# Patient Record
Sex: MALE | Race: WHITE | Employment: FULL TIME | URBAN - METROPOLITAN AREA
[De-identification: names, ages, dates, MRNs, and addresses within clinical notes are randomized per-mention and may not be internally consistent; named-entity substitution may affect disease eponyms.]

---

## 2021-12-10 ENCOUNTER — HOSPITAL ENCOUNTER (OUTPATIENT)
Dept: PHYSICAL THERAPY | Age: 56
Discharge: HOME | End: 2021-12-10
Payer: OTHER GOVERNMENT

## 2021-12-10 PROCEDURE — 97110 THERAPEUTIC EXERCISES: CPT

## 2021-12-10 PROCEDURE — 97140 MANUAL THERAPY 1/> REGIONS: CPT

## 2021-12-10 PROCEDURE — 97162 PT EVAL MOD COMPLEX 30 MIN: CPT

## 2021-12-10 NOTE — PROGRESS NOTES
PT DAILY TREATMENT NOTE     Patient Name: You Burgos  Date:12/10/2021  : 1965  [x]  Patient  Verified  Payor:  / Plan: Guy Mccurdy 73 / Product Type:  /    In time:1000  Out time:1052  Total Treatment Time (min): 52  Visit #: 1 of     Medicare/BCBS Only   Total Timed Codes (min):   1:1 Treatment Time:         Treatment Area: Left elbow pain [M25.522]    SUBJECTIVE  Pain Level (0-10 scale): 4-5/10  Any medication changes, allergies to medications, adverse drug reactions, diagnosis change, or new procedure performed?: [x] No    [] Yes (see summary sheet for update)  Subjective functional status/changes:   [] No changes reported      OBJECTIVE    Modality rationale:    Min Type Additional Details    [] Estim:  []Unatt       []IFC  []Premod                        []Other:  []w/ice   []w/heat  Position:  Location:    [] Estim: []Att    []TENS instruct  []NMES                    []Other:  []w/US   []w/ice   []w/heat  Position:  Location:    []  Traction: [] Cervical       []Lumbar                       [] Prone          []Supine                       []Intermittent   []Continuous Lbs:  [] before manual  [] after manual    []  Ultrasound: []Continuous   [] Pulsed                           []1MHz   []3MHz W/cm2:  Location:    []  Iontophoresis with dexamethasone         Location: [] Take home patch   [] In clinic    []  Ice     []  heat  []  Ice massage  []  Laser   []  Anodyne Position:  Location:    []  Laser with stim  []  Other:  Position:  Location:    []  Vasopneumatic Device    []  Right     []  Left  Pre-treatment girth:  Post-treatment girth:  Measured at (location):  Pressure:       [] lo [] med [] hi   Temperature: [] lo [] med [] hi   [] Skin assessment post-treatment:  []intact []redness- no adverse reaction    []redness  adverse reaction:     Vasopnuematic compression justification:  Per bilateral girth measures taken and listed above the edema is considered significant and having an impact on the patient's strength and gait/posture       34 min [x]Eval                  []Re-Eval       10 min Therapeutic Exercise:  [x] See flow sheet :HEP   Rationale: increase ROM and increase strength to improve the patients ability to perform lifting, carrying activities     8 min Manual Therapy:  STM to Left extensor wad, manual stretching to Left elbow into flexion/extension    The manual therapy interventions were performed at a separate and distinct time from the therapeutic activities interventions. Rationale: decrease pain, increase ROM, increase tissue extensibility and decrease trigger points to perform ADLs       With   [] TE   [] TA   [] neuro   [] other: Patient Education: [x] Review HEP    [] Progressed/Changed HEP based on:   [] positioning   [] body mechanics   [] transfers   [] heat/ice application    [] other:      Other Objective/Functional Measures: see POC     Fall Risk Assessment: Does the patient have a fear of falling? No If yes, what fall risk assessment was performed? Pain Level (0-10 scale) post treatment: 4-5/10    ASSESSMENT/Changes in Function: see POC    Patient will continue to benefit from skilled PT services to modify and progress therapeutic interventions, address functional mobility deficits, address ROM deficits, address strength deficits, analyze and address soft tissue restrictions, analyze and cue movement patterns, analyze and modify body mechanics/ergonomics, assess and modify postural abnormalities and instruct in home and community integration to attain remaining goals.      [x]  See Plan of Care  []  See progress note/recertification  []  See Discharge Summary         Progress towards goals / Updated goals:  See POC    PLAN  []  Upgrade activities as tolerated     [x]  Continue plan of care  []  Update interventions per flow sheet       []  Discharge due to:_  []  Other:_      Sakina Wesley, PT 12/10/2021  9:55 AM    Future Appointments   Date Time Provider Subha Kat   12/10/2021 10:00 AM Javon Santamaria PT [facility]

## 2021-12-10 NOTE — PROGRESS NOTES
[facility]ane[facility] Mari  [address]  [phone] fax  Plan of Care/ Statement of Necessity for Physical Therapy Services    Patient name: Annalisa Hwang Start of Care: 12/10/2021   Referral source: Cordell العلي : 1965    Medical Diagnosis: Left elbow pain [M25.522]  Payor:  / Plan: Guy Mccurdy 73 / Product Type:  /  Onset Date:21    Treatment Diagnosis: Left elbow pain   Prior Hospitalization: see medical history Provider#: [id number]   Medications: Verified on Patient summary List    Comorbidities: arthritis   Prior Level of Function: Functionally independent, lives with wife, 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 Assessment/ key information: Patient is a pleasant Right handed 61year old male who presents following Left lateral elbow tendon repair on 21 due to history of tendonitis pain and building weakness. He states that injections did not alleviate pain, and he noticed worsening  strength and worsening hand numbness/tingling prior to surgery. Following surgery Patient was in a sling for one week but that has since been discharged. Currently he notes impaired ROM and pain with lifting activities, driving, and ADLs. At evaluation Patient has Left elbow AROM of -50 to 110 deg (Right -10 to 132 deg) as well as impaired shoulder, elbow, wrist strength. Good incisional healing with steri strips in tact, and tenderness in the extensor wad. Overall Patient is a good rehab candidate based on premorbid status, and will benefit from skilled physical therapy in order to improve Left UE functional abilities following surgery.      Evaluation Complexity History MEDIUM  Complexity : 1-2 comorbidities / personal factors will impact the outcome/ POC ; Examination LOW Complexity : 1-2 Standardized tests and measures addressing body structure, function, activity limitation and / or participation in recreation  ;Presentation LOW Complexity : Stable, uncomplicated  ;Clinical Decision Making MEDIUM Complexity : FOTO score of 26-74  Overall Complexity Rating: MEDIUM  Problem List: pain affecting function, decrease ROM, decrease strength, edema affecting function, decrease ADL/ functional abilitiies, decrease activity tolerance, decrease flexibility/ joint mobility and decrease transfer abilities   Treatment Plan may include any combination of the following: Therapeutic exercise, Therapeutic activities, Neuromuscular re-education, Physical agent/modality, Manual therapy, Patient education and Self Care training  Patient / Family readiness to learn indicated by: asking questions, trying to perform skills and interest  Persons(s) to be included in education: patient (P)  Barriers to Learning/Limitations: None  Patient Goal (s): strengthen elbow and arm  Patient Self Reported Health Status: good  Rehabilitation Potential: good    Short Term Goals: To be accomplished in 1 weeks:  Goal: Patient will be independent and compliant with HEP in order to progress toward long term goals. Status at last note/certification: issued and reviewed  Long Term Goals: To be accomplished in 6 weeks:  Goal: Patient will improve FOTO assessment score to 70 pts in order to indicate improved functional abilities. Status at last note/certification: 40 pts  Goal: Patient will improve Left elbow AROM to -10 to 130 deg in order to improve ease of ADLs. Status at last note/certification: -50 to 110 deg  Goal: Patient will improve Left elbow strength to 5/5 in order to improve ease of lifting tasks. Status at last note/certification: flexion 4/5, extension 4+/5  Goal: Patient will improve Left wrist flexion/extension strength to 5/5 in order to improve ease of functional tasks.   Status at last note/certification: 4/5  Goal: Patient will report overall at least 65% improvement in functional abilities in order to prepare for self management. Status at last note/certification: n/a    Frequency / Duration: Patient to be seen 2 to 3 times per week for 6 weeks. Patient/ Caregiver education and instruction: Diagnosis, prognosis, exercises   [x]  Plan of care has been reviewed with FAB Hurtado, PT 12/10/2021 9:55 AM  _____________________________________________________________________  I certify that the above Therapy Services are being furnished while the patient is under my care. I agree with the treatment plan and certify that this therapy is necessary.     [de-identified] Signature:____________Date:_________TIME:________  Hao Suarez  ** Signature, Date and Time must be completed for valid certification **    Please sign and return to [facility]  [address]  [phone] fax

## 2021-12-14 ENCOUNTER — HOSPITAL ENCOUNTER (OUTPATIENT)
Dept: PHYSICAL THERAPY | Age: 56
Discharge: HOME | End: 2021-12-14
Payer: OTHER GOVERNMENT

## 2021-12-14 PROCEDURE — 97110 THERAPEUTIC EXERCISES: CPT

## 2021-12-14 PROCEDURE — 97140 MANUAL THERAPY 1/> REGIONS: CPT

## 2021-12-14 NOTE — PROGRESS NOTES
PT DAILY TREATMENT NOTE     Patient Name: America Bustillo  Date:2021  : 1965  [x]  Patient  Verified  Payor:  / Plan: [facility] / Product Type:  /    In time: 1:16  Out time:2:25  Total Treatment Time (min): 69    Visit #: 2 of     Treatment Area: Left elbow pain [M25.522]    SUBJECTIVE  Pain Level IN: (0-10 scale): 1-2/10   Pain Level OUT: (0-10 scale) post treatment: 0/10    Any medication changes, allergies to medications, adverse drug reactions, diagnosis change, or new procedure performed?: [x] No    [] Yes (see summary sheet for update)  Subjective functional status/changes:   [] No changes reported  I am doing good, I only really feel it when I do something and it moves       OBJECTIVE    Modalities Rationale:     decrease edema, decrease inflammation and decrease pain to improve patient's ability to use the ADLs     min [] Estim, type/location:                                      []  att     []  unatt     []  w/US     []  w/ice    []  w/heat    min []  Mechanical Traction: type/lbs                   []  pro   []  sup   []  int   []  cont    []  before manual    []  after manual    min []  Ultrasound, settings/location:      min []  Iontophoresis w/ dexamethasone, location:                                               []  take home patch       []  in clinic   10+2 set up min [x]  Ice     []  Heat    location/position: (L) elbow     min []  Vasopneumatic Device, press/temp:     min []  Other:    [] Skin assessment post-treatment (if applicable):    []  intact    []  redness- no adverse reaction     []redness  adverse reaction:             42 min Therapeutic Exercise:  [x] See flow sheet : first follow up visit since initial evaluation - initiated POC per flow sheet    Rationale: increase ROM and increase strength to improve the patients ability to increase ROM and achieve all goals stated below     15 min Manual Therapy: Technique:      [] S/DTM []IASTM []PROM [] Passive Stretching   [] Graston:  [] GT 1  [] GT 2 [] GT 3 [] GT 4 [] GT 4 [] GT 5  [] GT 6  [] Sweep [] Fan [] [place]  [] Brush   []  Swivel []J- Stroke [] Scoop []IFraming     []Manual TPR  [] TDN (see objective; actual needle insertion time not billed)  []Jt manipulation:Gr I [] II []  III [] IV[] V[]  Treatment/Area:  PROM and STM to the extensor    Rationale:      decrease pain, increase ROM, increase tissue extensibility and decrease trigger points to improve patient's ability to use (L) elbow with normal ADL        With   [] TE   [] TA   [] neuro   [] other: Patient Education: [x] Review HEP    [] Progressed/Changed HEP based on:   [] positioning   [] body mechanics   [] transfers   [] heat/ice application    [] Graston Education: Explained the effects and benefits of Graston Technique therapy including potential for post treatment soreness and bruising. [] Other:           Objective/Functional Measures with Therapist Assessment Noted with Response to Therapy Session:     first follow up visit since initial evaluation -       initiated POC per flow sheet   Patient tolerated all exercises well except for triceps kick backs some elbow discomfort reported at end range with elbow extension   Replied steri stripe due to the ones that MD office placed on patient yesterday already fell off, patient just had sutures removed yesterday   Stressed the importance of no wrist extension and to use ice due to note able edema to the extensor wad      ASSESSMENT/Changes in Function:     Patient will continue to benefit from skilled PT services to modify and progress therapeutic interventions, address functional mobility deficits, address ROM deficits, address strength deficits and analyze and address soft tissue restrictions to attain remaining goals.      [x]  See Plan of Care  []  See progress note/recertification  []  See Discharge Summary         Progress towards goals / Updated goals:  Short Term Goals: To be accomplished in 1 weeks:  Goal: Patient will be independent and compliant with HEP in order to progress toward long term goals. Status at last note/certification: issued and reviewed  Long Term Goals: To be accomplished in 6 weeks:  Goal: Patient will improve FOTO assessment score to 70 pts in order to indicate improved functional abilities. Status at last note/certification: 40 pts  Goal: Patient will improve Left elbow AROM to -10 to 130 deg in order to improve ease of ADLs. Status at last note/certification: -50 to 110 deg  Goal: Patient will improve Left elbow strength to 5/5 in order to improve ease of lifting tasks. Status at last note/certification: flexion 4/5, extension 4+/5  Goal: Patient will improve Left wrist flexion/extension strength to 5/5 in order to improve ease of functional tasks. Status at last note/certification: 4/5  Goal: Patient will report overall at least 65% improvement in functional abilities in order to prepare for self management.   Status at last note/certification: n/a    PLAN  [x]  Upgrade activities as tolerated     [x]  Continue plan of care  []  Update interventions per flow sheet       []  Discharge due to:_  []  Other:_      Maral Hickman PTA 12/14/2021  1:29 PM    Future Appointments   Date Time Provider Suhba Kat   12/16/2021 10:00 AM Bin Gee PTA [facility]   12/17/2021 10:00 AM [facility] [place] [facility]   12/20/2021  3:30 PM [facility]   12/22/2021  3:30 PM [facility] [place] [facility]   12/27/2021  2:45 PM Demi Hernández, PT [facility]   12/29/2021 10:45 AM BROOKS MendozaT [facility]   12/30/2021 10:45 AM [facility] [address] [facility]

## 2021-12-16 ENCOUNTER — HOSPITAL ENCOUNTER (OUTPATIENT)
Dept: PHYSICAL THERAPY | Age: 56
Discharge: HOME | End: 2021-12-16
Payer: OTHER GOVERNMENT

## 2021-12-16 PROCEDURE — 97110 THERAPEUTIC EXERCISES: CPT

## 2021-12-16 PROCEDURE — 97140 MANUAL THERAPY 1/> REGIONS: CPT

## 2021-12-16 NOTE — PROGRESS NOTES
PT DAILY TREATMENT NOTE     Patient Name: Foreign Valenzuela  Date:2021  : 1965  [x]  Patient  Verified  Payor:  [facility] / Product Type:  /    In time: 10:01  Out time: 10:56  Total Treatment Time (min): 55    Visit #: 3 of     Treatment Area: Left elbow pain [M25.522]    SUBJECTIVE  Pain Level IN: (0-10 scale): 410   Pain Level OUT: (0-10 scale) post treatment: 3/10- stiff    Any medication changes, allergies to medications, adverse drug reactions, diagnosis change, or new procedure performed?: [x] No    [] Yes (see summary sheet for update)  Subjective functional status/changes:   [] No changes reported    Pt reports some increased discomfort today and soreness after last PT session     OBJECTIVE    Modalities Rationale:     decrease edema, decrease inflammation and decrease pain to improve patient's ability to use left UE for ADLs     min [] Estim, type/location:                                      []  att     []  unatt     []  w/US     []  w/ice    []  w/heat    min []  Mechanical Traction: type/lbs                   []  pro   []  sup   []  int   []  cont    []  before manual    []  after manual    min []  Ultrasound, settings/location:      min []  Iontophoresis w/ dexamethasone, location:                                               []  take home patch       []  in clinic   10 + 2' set up min [x]  Ice     []  Heat    location/position:  left elbow, with arm elevated     min []  Vasopneumatic Device, press/temp:     min []  Other:    [] Skin assessment post-treatment (if applicable):    []  intact    []  redness- no adverse reaction     []redness  adverse reaction:             28 min Therapeutic Exercise:  [x] See flow sheet :    Rationale: increase ROM and increase strength to improve the patients ability to increase ROM, increase strength and progress toward goals for increased independence and safe return to PLOF.      15 min Manual Therapy: Technique:      [x] S/DTM []IASTM []PROM [] Passive Stretching   [] Graston:  [] GT 1  [] GT 2 [] GT 3 [] GT 4 [] GT 4 [] GT 5  [] GT 6  [] Sweep [] Fan [] [place]  [] Brush   []  Swivel []J- Stroke [] Scoop []IFraming     []Manual TPR  [] TDN (see objective; actual needle insertion time not billed)  []Jt manipulation:Gr I [] II []  III [] IV[] V[]  Treatment/Area:  STM to left extensor wad in sitting and supine with UE at 90 degrees of flexion. STM to left triceps in supine with UE at 90 deg of flexion. Retrograde massage for swelling. Rationale:      decrease pain, increase ROM, increase tissue extensibility and decrease trigger points to improve patient's ability to use (L) elbow with normal ADL    With   [x] TE   [] TA   [] neuro   [] other: Patient Education: [x] Review HEP    [] Progressed/Changed HEP based on:   [] positioning   [] body mechanics   [] transfers   [] heat/ice application    [] Graston Education:   [] Other:         Objective/Functional Measures: Added: submax isometrics 25% ulnar and radial deviation     ASSESSMENT/Changes in Function: held triceps extensions, supination and flex bar twist today secondary to increased pain after last PT session. Pt with ttp along left lateral triceps head with some improvements reported post manual therapy techniques. Pt with positive response to PT session as patient reports decreased pain levels, though some stiffness post PT session. Patient will continue to benefit from skilled PT services to modify and progress therapeutic interventions, address functional mobility deficits, address ROM deficits, address strength deficits and analyze and address soft tissue restrictions to attain remaining goals. [x]  See Plan of Care  []  See progress note/recertification  []  See Discharge Summary         Progress towards goals / Updated goals:  Short Term Goals:  To be accomplished in 1 weeks:  Goal: Patient will be independent and compliant with HEP in order to progress toward long term goals. Status at last note/certification: issued and reviewed  Current: goal in progress- pt reports no questions regarding initial HEP provided (12/16/21)    Long Term Goals: To be accomplished in 6 weeks:  Goal: Patient will improve FOTO assessment score to 70 pts in order to indicate improved functional abilities. Status at last note/certification: 40 pts  Current: Will assess near MD note (12/16/21)  Goal: Patient will improve Left elbow AROM to -10 to 130 deg in order to improve ease of ADLs. Status at last note/certification: -50 to 110 deg  Current:  Goal: Patient will improve Left elbow strength to 5/5 in order to improve ease of lifting tasks. Status at last note/certification: flexion 4/5, extension 4+/5  Current:  Goal: Patient will improve Left wrist flexion/extension strength to 5/5 in order to improve ease of functional tasks. Status at last note/certification: 4/5  Current:  Goal: Patient will report overall at least 65% improvement in functional abilities in order to prepare for self management.   Status at last note/certification: n/a  Current:    PLAN  [x]  Upgrade activities as tolerated     [x]  Continue plan of care  []  Update interventions per flow sheet       []  Discharge due to:_  [x]  Other: trial of KT tape for swelling next visit       Nenita Sotelo PT 12/16/2021  11:13 AM     Future Appointments   Date Time Provider Subha Kat   12/17/2021 10:00 AM Cristina Pollack PTA [facility]   12/20/2021  3:30 PM [facility]   12/22/2021  3:30 PM [address] [place] [address] 1 [facility]   12/27/2021  2:45 PM Miracle Hu, PT [facility]   12/29/2021 10:45 AM Babita Mayer, DPT [facility]   12/30/2021 10:45 AM [facility] PT [address] [facility]

## 2021-12-17 ENCOUNTER — HOSPITAL ENCOUNTER (OUTPATIENT)
Dept: PHYSICAL THERAPY | Age: 56
Discharge: HOME | End: 2021-12-17
Payer: OTHER GOVERNMENT

## 2021-12-17 PROCEDURE — 97140 MANUAL THERAPY 1/> REGIONS: CPT

## 2021-12-17 PROCEDURE — 97110 THERAPEUTIC EXERCISES: CPT

## 2021-12-17 NOTE — PROGRESS NOTES
PT DAILY TREATMENT NOTE     Patient Name: Jorgito Edmond  Date:2021  : 1965  [x]  Patient  Verified  Payor:  / Plan: [facility]  [facility] REGION / Product Type:  /    In time: 10:03  Out time: 10:58  Total Treatment Time (min): 48    Visit #: 4 of     Treatment Area: Left elbow pain [M25.522]    SUBJECTIVE  Pain Level IN: (0-10 scale): 0/10  Pain Level OUT: (0-10 scale) post treatment: 0/10    Any medication changes, allergies to medications, adverse drug reactions, diagnosis change, or new procedure performed?: [x] No    [] Yes (see summary sheet for update)  Subjective functional status/changes:   [] No changes reported  Whatever liam the PT did yesterday really seemed to help, it hurt while it was being done but now I have no pain       OBJECTIVE    Modalities Rationale:     decrease edema, decrease inflammation and decrease pain to improve patient's ability to use left UE for ADLs     min [] Estim, type/location:                                      []  att     []  unatt     []  w/US     []  w/ice    []  w/heat    min []  Mechanical Traction: type/lbs                   []  pro   []  sup   []  int   []  cont    []  before manual    []  after manual    min []  Ultrasound, settings/location:      min []  Iontophoresis w/ dexamethasone, location:                                               []  take home patch       []  in clinic   10 + 2' set up min [x]  Ice     []  Heat    location/position:  left elbow, with arm elevated     min []  Vasopneumatic Device, press/temp:     min []  Other:    [] Skin assessment post-treatment (if applicable):    []  intact    []  redness- no adverse reaction     []redness  adverse reaction:             21 min Therapeutic Exercise:  [x] See flow sheet :    Rationale: increase ROM and increase strength to improve the patients ability to increase ROM, increase strength and progress toward goals for increased independence and safe return to PLOF.     20 min Manual Therapy: Technique:      [x] S/DTM []IASTM []PROM [] Passive Stretching   [] Graston:  [] GT 1  [] GT 2 [] GT 3 [] GT 4 [] GT 4 [] GT 5  [] GT 6  [] Sweep [] Fan [] [place]  [] Brush   []  Swivel []J- Stroke [] Scoop []IFraming     []Manual TPR  [] TDN (see objective; actual needle insertion time not billed)  []Jt manipulation:Gr I [] II []  III [] IV[] V[]  Treatment/Area:  STM to left extensor wad in sitting and supine with UE at 90 degrees of flexion. STM to left triceps in supine with UE at 90 deg of flexion. Retrograde massage for swelling. Rationale:      decrease pain, increase ROM, increase tissue extensibility and decrease trigger points to improve patient's ability to use (L) elbow with normal ADL    With   [x] TE   [] TA   [] neuro   [] other: Patient Education: [x] Review HEP    [] Progressed/Changed HEP based on:   [] positioning   [] body mechanics   [] transfers   [] heat/ice application    [] Graston Education:   [] Other:         Objective/Functional Measures: noted decrease edema to the extensor wad, still presents with some tightness and restriction to the tricep noted during manual. Did not require K-tape for edema control  Patient did experience some discomfort with bicep curls into elbow, changed to hammer curls instead with no discomfort reported       ASSESSMENT/Changes in Function:     Patient will continue to benefit from skilled PT services to modify and progress therapeutic interventions, address functional mobility deficits, address ROM deficits, address strength deficits and analyze and address soft tissue restrictions to attain remaining goals. [x]  See Plan of Care  []  See progress note/recertification  []  See Discharge Summary         Progress towards goals / Updated goals:  Short Term Goals: To be accomplished in 1 weeks:  Goal: Patient will be independent and compliant with HEP in order to progress toward long term goals.   Status at last note/certification: issued and reviewed  Current: goal in progress- pt reports no questions regarding initial HEP provided (12/16/21)    Long Term Goals: To be accomplished in 6 weeks:  Goal: Patient will improve FOTO assessment score to 70 pts in order to indicate improved functional abilities. Status at last note/certification: 40 pts  Current: Will assess near MD note (12/16/21)  Goal: Patient will improve Left elbow AROM to -10 to 130 deg in order to improve ease of ADLs. Status at last note/certification: -50 to 110 deg  Current:  Goal: Patient will improve Left elbow strength to 5/5 in order to improve ease of lifting tasks. Status at last note/certification: flexion 4/5, extension 4+/5  Current:  Goal: Patient will improve Left wrist flexion/extension strength to 5/5 in order to improve ease of functional tasks. Status at last note/certification: 4/5  Current:  Goal: Patient will report overall at least 65% improvement in functional abilities in order to prepare for self management.   Status at last note/certification: n/a  Current:    PLAN  [x]  Upgrade activities as tolerated     [x]  Continue plan of care  []  Update interventions per flow sheet       []  Discharge due to:_  []  Other:       Dominique Crouch PTA 12/17/2021  11:13 AM     Future Appointments   Date Time Provider Subha Kat   12/20/2021  3:30 PM [facility] [place] [facility]   12/22/2021  3:30 PM [facility] [place] [facility]   12/27/2021  2:45 PM Leopoldo Richardson, PT [facility]   12/29/2021 10:45 AM Mela Severin, DPT [facility]   12/30/2021 10:45 AM [facility] PT [place] [phone] [facility]

## 2021-12-20 ENCOUNTER — HOSPITAL ENCOUNTER (OUTPATIENT)
Dept: PHYSICAL THERAPY | Age: 56
Discharge: HOME | End: 2021-12-20
Payer: OTHER GOVERNMENT

## 2021-12-20 PROCEDURE — 97110 THERAPEUTIC EXERCISES: CPT

## 2021-12-20 PROCEDURE — 97140 MANUAL THERAPY 1/> REGIONS: CPT

## 2021-12-20 NOTE — PROGRESS NOTES
PT DAILY TREATMENT NOTE     Patient Name: Nahomy Hernandez  Date:2021  : 1965  [x]  Patient  Verified  Payor:  / Plan: [facility]  [facility] REGION / Product Type:  /    In time: 15:30 Out time: 16:33  Total Treatment Time (min): 63    Visit #: 5 of     Treatment Area: Left elbow pain [M25.522]    SUBJECTIVE  Pain Level IN: (0-10 scale):1/10  Pain Level OUT: (0-10 scale) post treatment: 1/10    Any medication changes, allergies to medications, adverse drug reactions, diagnosis change, or new procedure performed?: [x] No    [] Yes (see summary sheet for update)  Subjective functional status/changes:   [] No changes reported    Pt reports bandage was taken off   Pt reports caution with lifting and continued stiffness in left elbow     OBJECTIVE    Modalities Rationale:     decrease edema, decrease inflammation and decrease pain to improve patient's ability to use left UE for ADLs     min [] Estim, type/location:                                      []  att     []  unatt     []  w/US     []  w/ice    []  w/heat    min []  Mechanical Traction: type/lbs                   []  pro   []  sup   []  int   []  cont    []  before manual    []  after manual    min []  Ultrasound, settings/location:      min []  Iontophoresis w/ dexamethasone, location:                                               []  take home patch       []  in clinic   10' + 1' set up min [x]  Ice     []  Heat    location/position:  left elbow in supine     min []  Vasopneumatic Device, press/temp:     min []  Other:    [] Skin assessment post-treatment (if applicable):    []  intact    []  redness- no adverse reaction     []redness  adverse reaction:             42/37 min Therapeutic Exercise:  [x] See flow sheet :   UBE warm up (nc for 5 min warm up)   Rationale: increase ROM and increase strength to improve the patients ability to increase ROM, increase strength and progress toward goals for increased independence and safe return to PLOF. 10 min Manual Therapy: Technique:      [x] S/DTM []IASTM []PROM [] Passive Stretching   [] Graston:  [] GT 1  [] GT 2 [] GT 3 [] GT 4 [] GT 4 [] GT 5  [] GT 6  [] Sweep [] Fan [] [place]  [] Brush   []  Swivel []J- Stroke [] Scoop []IFraming     []Manual TPR  [] TDN (see objective; actual needle insertion time not billed)  []Jt manipulation:Gr I [] II []  III [] IV[] V[]  Treatment/Area:  STM to left extensor wad in supine with UE at 90 degrees of flexion. STM to left triceps in supine with UE at 90 deg of flexion. STM to left biceps; gentle tissue mobilization around incision, avoiding incision. Retrograde massage for swelling. Rationale:      decrease pain, increase ROM, increase tissue extensibility and decrease trigger points to improve patient's ability to use left elbow with normal ADL    With   [x] TE   [] TA   [] neuro   [] other: Patient Education: [x] Review HEP    [] Progressed/Changed HEP based on:   [] positioning   [] body mechanics   [] transfers   [] heat/ice application    [] Graston Education:   [] Other:         Objective/Functional Measures:   Left elbow AROM: lacking 8 degrees of extension to 130 degrees of flexion, pain at end range    Re-introduced: triceps extension, flex bar wrist flexion (all in pain free range)     Added: wall clocks      ASSESSMENT/Changes in Function:  Overall, the patient is making progress towards therapy goals of AROM of left elbow as demonstrated by goniometric measurements (see below), though with continued reports of stiffness and slight pain at end range. Pt tolerated treatment session with no increased pain and no adverse reactions noted. Patient will continue to benefit from skilled PT services to modify and progress therapeutic interventions, address functional mobility deficits, address ROM deficits, address strength deficits and analyze and address soft tissue restrictions to attain remaining goals.      [x]  See Plan of Care  [] See progress note/recertification  []  See Discharge Summary         Progress towards goals / Updated goals:  Progress towards goals / Updated goals:  Short Term Goals: To be accomplished in 1 weeks:  Goal: Patient will be independent and compliant with HEP in order to progress toward long term goals. Status at last note/certification: issued and reviewed  Current: goal in progress- pt reports no questions regarding initial HEP provided (12/16/21)     Long Term Goals: To be accomplished in 6 weeks:  Goal: Patient will improve FOTO assessment score to 70 pts in order to indicate improved functional abilities. Status at last note/certification: 40 pts  Current: Will assess near MD note (12/16/21)  Goal: Patient will improve Left elbow AROM to -10 to 130 deg in order to improve ease of ADLs. Status at last note/certification: -50 to 110 deg  Current: Left elbow AROM: lacking 8 degrees of extension to 130 degrees of flexion, pain at end range (12/20/21) progressing   Goal: Patient will improve Left elbow strength to 5/5 in order to improve ease of lifting tasks. Status at last note/certification: flexion 4/5, extension 4+/5  Current:  Goal: Patient will improve Left wrist flexion/extension strength to 5/5 in order to improve ease of functional tasks. Status at last note/certification: 4/5  Current:  Goal: Patient will report overall at least 65% improvement in functional abilities in order to prepare for self management.   Status at last note/certification: n/a  Current:    PLAN  [x]  Upgrade activities as tolerated     [x]  Continue plan of care  []  Update interventions per flow sheet       []  Discharge due to:_  []  Other:     Florian Hu PT 12/20/2021  5:10 PM     Future Appointments   Date Time Provider Subha Kat   12/22/2021  3:30 PM [facility]   12/27/2021  2:45 PM Maame  [facility]   12/29/2021 10:45 AM Kyleigh Garrison DPT [facility]   12/30/2021 10:45 AM [facility] [place] [facility]

## 2021-12-22 ENCOUNTER — HOSPITAL ENCOUNTER (OUTPATIENT)
Dept: PHYSICAL THERAPY | Age: 56
Discharge: HOME | End: 2021-12-22
Payer: OTHER GOVERNMENT

## 2021-12-22 PROCEDURE — 97140 MANUAL THERAPY 1/> REGIONS: CPT

## 2021-12-22 PROCEDURE — 97110 THERAPEUTIC EXERCISES: CPT

## 2021-12-22 NOTE — PROGRESS NOTES
PT DAILY TREATMENT NOTE     Patient Name: Libertad Davis  Date:2021  : 1965  [x]  Patient  Verified  Payor:  / Plan: [facility]  [facility] REGION / Product Type:  /    In time: 3:30 Out time: 4:25  Total Treatment Time (min): 55    Visit #: 6 of     Treatment Area: Left elbow pain [M25.522]    SUBJECTIVE  Pain Level IN: (0-10 scale):1/10  Pain Level OUT: (0-10 scale) post treatment: 0/10  Any medication changes, allergies to medications, adverse drug reactions, diagnosis change, or new procedure performed?: [x] No    [] Yes (see summary sheet for update)  Subjective functional status/changes:   [] No changes reported  \"Had an episode of sharp pain behind my scar this morning, I wasn't doing anything and I have been fine since. \"    OBJECTIVE     Modalities Rationale:     decrease edema, decrease inflammation and decrease pain to improve patient's ability to use left UE for ADLs     min [] Estim, type/location:                                      []  att     []  unatt     []  w/US     []  w/ice    []  w/heat    min []  Mechanical Traction: type/lbs                   []  pro   []  sup   []  int   []  cont    []  before manual    []  after manual    min []  Ultrasound, settings/location:      min []  Iontophoresis w/ dexamethasone, location:                                               []  take home patch       []  in clinic   10   min [x]  Ice     []  Heat    location/position: L elbow in supine     min []  Vasopneumatic Device, press/temp:     min []  Other:    [x] Skin assessment post-treatment (if applicable):    [x]  intact    []  redness- no adverse reaction     []redness  adverse reaction:        35 min Therapeutic Exercise:  [x] See flow sheet :    Rationale: increase ROM and increase strength to improve the patients ability to increase ROM, increase strength and progress toward goals for increased independence and safe return to PLOF.      10 min Manual Therapy: STM to L extensor wad in supine with UE at 90 degrees of flexion. STM to left triceps in supine with UE at 90 deg of flexion. STM to left biceps; gentle tissue mobilization around incision, avoiding incision. Retrograde massage for swelling. Rationale:      decrease pain, increase ROM, increase tissue extensibility and decrease trigger points to improve patient's ability to use left elbow with normal ADL    With   [x] TE   [] TA   [] neuro   [] other: Patient Education: [x] Review HEP    [] Progressed/Changed HEP based on:   [] positioning   [] body mechanics   [] transfers   [] heat/ice application    [] Graston Education:   [] Other:         Objective/Functional Measures:   L elbow AROM: -8 deg to 130 deg; painful endrange      ASSESSMENT/Changes in Function:  Pt is 3 weeks post op and progressing well with strength and mobility within protocol. Mild endrange pain. Pt understands precautions of no wrist ext. Patient will continue to benefit from skilled PT services to modify and progress therapeutic interventions, address functional mobility deficits, address ROM deficits, address strength deficits and analyze and address soft tissue restrictions to attain remaining goals. [x]  See Plan of Care  []  See progress note/recertification  []  See Discharge Summary         Progress towards goals / Updated goals:  Progress towards goals / Updated goals:  Short Term Goals: To be accomplished in 1 weeks:  Goal: Patient will be independent and compliant with HEP in order to progress toward long term goals. Status at last note/certification: issued and reviewed  Current: goal in progress- pt reports no questions regarding initial HEP provided (12/16/21)     Long Term Goals: To be accomplished in 6 weeks:  Goal: Patient will improve FOTO assessment score to 70 pts in order to indicate improved functional abilities. Status at last note/certification: 40 pts  Current:  Will assess near MD note (12/16/21)  Goal: Patient will improve Left elbow AROM to -10 to 130 deg in order to improve ease of ADLs. Status at last note/certification: -50 to 110 deg  Current: Left elbow AROM: lacking 8 degrees of extension to 130 degrees of flexion, pain at end range (12/20/21) progressing   Goal: Patient will improve Left elbow strength to 5/5 in order to improve ease of lifting tasks. Status at last note/certification: flexion 4/5, extension 4+/5  Current:  Goal: Patient will improve Left wrist flexion/extension strength to 5/5 in order to improve ease of functional tasks. Status at last note/certification: 4/5  Current:  Goal: Patient will report overall at least 65% improvement in functional abilities in order to prepare for self management.   Status at last note/certification: n/a  Current:    PLAN  [x]  Upgrade activities as tolerated     [x]  Continue plan of care  []  Update interventions per flow sheet       []  Discharge due to:_  []  Other:     Dorinda Marsh, PTA 12/22/2021  5:10 PM     Future Appointments   Date Time Provider Subha Kat   12/22/2021  3:30 PM [facility] [place] [facility]   12/27/2021  2:45 PM Augustus Roberts, PT [facility]   12/29/2021 10:45 AM BROOKS ChuT [facility]   12/30/2021 10:45 AM [facility] PT [place] [phone] [facility]

## 2021-12-27 ENCOUNTER — HOSPITAL ENCOUNTER (OUTPATIENT)
Dept: PHYSICAL THERAPY | Age: 56
Discharge: HOME | End: 2021-12-27
Payer: OTHER GOVERNMENT

## 2021-12-27 PROCEDURE — 97140 MANUAL THERAPY 1/> REGIONS: CPT

## 2021-12-27 PROCEDURE — 97110 THERAPEUTIC EXERCISES: CPT

## 2021-12-27 NOTE — PROGRESS NOTES
PT DAILY TREATMENT NOTE     Patient Name: Alex Leo  Date:2021  : 1965  [x]  Patient  Verified  Payor:  / Plan: [facility] REGION / Product Type:  /    In time:235    Out time:332  Total Treatment Time (min): 57  Visit #: 7 of 12-15    Medicare/BCBS Only   Total Timed Codes (min):   1:1 Treatment Time:         Treatment Area: Left elbow pain [M25.522]    SUBJECTIVE  Pain Level (0-10 scale): 4/10  Any medication changes, allergies to medications, adverse drug reactions, diagnosis change, or new procedure performed?: [x] No    [] Yes (see summary sheet for update)  Subjective functional status/changes:   [] No changes reported  I don't really have pain it's just more of a tightness.      OBJECTIVE    Modality rationale: Patient denied   Min Type Additional Details    [] Estim:  []Unatt       []IFC  []Premod                        []Other:  []w/ice   []w/heat  Position:  Location:    [] Estim: []Att    []TENS instruct  []NMES                    []Other:  []w/US   []w/ice   []w/heat  Position:  Location:    []  Traction: [] Cervical       []Lumbar                       [] Prone          []Supine                       []Intermittent   []Continuous Lbs:  [] before manual  [] after manual    []  Ultrasound: []Continuous   [] Pulsed                           []1MHz   []3MHz W/cm2:  Location:    []  Iontophoresis with dexamethasone         Location: [] Take home patch   [] In clinic    []  Ice     []  heat  []  Ice massage  []  Laser   []  Anodyne Position:  Location:    []  Laser with stim  []  Other:  Position:  Location:    []  Vasopneumatic Device    []  Right     []  Left  Pre-treatment girth:  Post-treatment girth:  Measured at (location):  Pressure:       [] lo [] med [] hi   Temperature: [] lo [] med [] hi   [] Skin assessment post-treatment:  []intact []redness- no adverse reaction    []redness  adverse reaction:     Vasopnuematic compression justification:  Per bilateral girth measures taken and listed above the edema is considered significant and having an impact on the patient's strength and gait/posture     45 min Therapeutic Exercise:  [x] See flow sheet :   Rationale: increase ROM and increase strength to improve the patients ability to perform ADLs    12 min Manual Therapy:  STM/TRP to Left extensor wad, gentle scar massage, manual stretching to Left elbow into flexion/extension    The manual therapy interventions were performed at a separate and distinct time from the therapeutic activities interventions. Rationale: decrease pain, increase ROM, increase tissue extensibility and decrease trigger points to improve Left UE functional abilities      With   [] TE   [] TA   [] neuro   [] other: Patient Education: [x] Review HEP    [] Progressed/Changed HEP based on:   [] positioning   [] body mechanics   [] transfers   [] heat/ice application    [] other:      Other Objective/Functional Measures: updated HEP     Pain Level (0-10 scale) post treatment: 0/10    ASSESSMENT/Changes in Function: Patient demonstrates great improvement in Left elbow and wrist strength. He reports only minor pain/tightness in the elbow with some motions. Improving ROM and neutral rest position of Left elbow. Progress per protocol. Patient will continue to benefit from skilled PT services to modify and progress therapeutic interventions, address functional mobility deficits, address ROM deficits, address strength deficits, analyze and address soft tissue restrictions, analyze and cue movement patterns, analyze and modify body mechanics/ergonomics, assess and modify postural abnormalities and instruct in home and community integration to attain remaining goals.      []  See Plan of Care  []  See progress note/recertification  []  See Discharge Summary         Progress towards goals / Updated goals:  Short Term Goals: To be accomplished in 1 weeks:  Goal: Patient will be independent and compliant with HEP in order to progress toward long term goals. Status at last note/certification: issued and reviewed  Current: goal in progress- pt reports no questions regarding initial HEP provided (12/16/21)  Long Term Goals: To be accomplished in 6 weeks:  Goal: Patient will improve FOTO assessment score to 70 pts in order to indicate improved functional abilities. Status at last note/certification: 40 pts  Current: Will assess near MD note (12/16/21)  Goal: Patient will improve Left elbow AROM to -10 to 130 deg in order to improve ease of ADLs. Status at last note/certification: -50 to 110 deg  Current: Left elbow AROM: lacking 8 degrees of extension to 130 degrees of flexion, pain at end range (12/20/21) progressing    Goal: Patient will improve Left elbow strength to 5/5 in order to improve ease of lifting tasks. Status at last note/certification: flexion 4/5, extension 4+/5  Current: met 12/27/21  Goal: Patient will improve Left wrist flexion/extension strength to 5/5 in order to improve ease of functional tasks. Status at last note/certification: 4/5  Current: met 12/27/21  Goal: Patient will report overall at least 65% improvement in functional abilities in order to prepare for self management.   Status at last note/certification: n/a  Current: reassess at MD note    PLAN  [x]  Upgrade activities as tolerated     [x]  Continue plan of care  [x]  Update interventions per flow sheet       []  Discharge due to:_  []  Other:_      Silvio Quinones, PT 12/27/2021  2:45 PM    Future Appointments   Date Time Provider Subha Kat   12/29/2021 10:45 AM Georgie Leblanc DPT [facility]   12/30/2021 10:45 AM [facility] PT [facility] [phone] [facility]

## 2021-12-29 ENCOUNTER — HOSPITAL ENCOUNTER (OUTPATIENT)
Dept: PHYSICAL THERAPY | Age: 56
Discharge: HOME | End: 2021-12-29
Payer: OTHER GOVERNMENT

## 2021-12-29 PROCEDURE — 97110 THERAPEUTIC EXERCISES: CPT

## 2021-12-29 PROCEDURE — 97140 MANUAL THERAPY 1/> REGIONS: CPT

## 2021-12-29 NOTE — PROGRESS NOTES
PT DAILY TREATMENT NOTE     Patient Name: Oneil Caballero  Date:2021  : 1965  [x]  Patient  Verified  Payor:  / Plan: [facility] REGION / Product Type:  /    In time:1:01    Out time:1:56  Total Treatment Time (min): 55  Visit #: 8 of 12-15        Treatment Area: Left elbow pain [M25.522]    SUBJECTIVE  Pain Level (0-10 scale): 0/10  Any medication changes, allergies to medications, adverse drug reactions, diagnosis change, or new procedure performed?: [x] No    [] Yes (see summary sheet for update)  Subjective functional status/changes:   [] No changes reported  When I do the tricep kickback I feel some tension in the elbow when it is fully flexed and when I fully extend it too     OBJECTIVE    Modality rationale: Patient denied   Min Type Additional Details    [] Estim:  []Unatt       []IFC  []Premod                        []Other:  []w/ice   []w/heat  Position:  Location:    [] Estim: []Att    []TENS instruct  []NMES                    []Other:  []w/US   []w/ice   []w/heat  Position:  Location:    []  Traction: [] Cervical       []Lumbar                       [] Prone          []Supine                       []Intermittent   []Continuous Lbs:  [] before manual  [] after manual    []  Ultrasound: []Continuous   [] Pulsed                           []1MHz   []3MHz W/cm2:  Location:    []  Iontophoresis with dexamethasone         Location: [] Take home patch   [] In clinic    []  Ice     []  heat  []  Ice massage  []  Laser   []  Anodyne Position:  Location:    []  Laser with stim  []  Other:  Position:  Location:    []  Vasopneumatic Device    []  Right     []  Left  Pre-treatment girth:  Post-treatment girth:  Measured at (location):  Pressure:       [] lo [] med [] hi   Temperature: [] lo [] med [] hi   [] Skin assessment post-treatment:  []intact []redness- no adverse reaction    []redness  adverse reaction:     Vasopnuematic compression justification:  Per bilateral girth measures taken and listed above the edema is considered significant and having an impact on the patient's strength and gait/posture     40 min Therapeutic Exercise:  [x] See flow sheet : increase weights to 2# for full cans, bicep, tricep    Rationale: increase ROM and increase strength to improve the patients ability to perform ADLs    15 min Manual Therapy:  STM/TRP to Left extensor wad, gentle scar massage, manual stretching to Left elbow into flexion/extension    The manual therapy interventions were performed at a separate and distinct time from the therapeutic activities interventions. Rationale: decrease pain, increase ROM, increase tissue extensibility and decrease trigger points to improve Left UE functional abilities      With   [] TE   [] TA   [] neuro   [] other: Patient Education: [x] Review HEP    [] Progressed/Changed HEP based on:   [] positioning   [] body mechanics   [] transfers   [] heat/ice application    [] other:      Other Objective/Functional Measures: increase weights to 2# for full cans, bicep, tricep      Pain Level (0-10 scale) post treatment: 0/10    ASSESSMENT/Changes in Function:    After manual patient was able to perform full flexion and extension of the elbow with less tension felt within the elbow joint  Patient did present with trigger point to the extensor wad that responded well with trigger point release to area    Patient will continue to benefit from skilled PT services to modify and progress therapeutic interventions, address functional mobility deficits, address ROM deficits, address strength deficits, analyze and address soft tissue restrictions, analyze and cue movement patterns, analyze and modify body mechanics/ergonomics, assess and modify postural abnormalities and instruct in home and community integration to attain remaining goals.      []  See Plan of Care  []  See progress note/recertification  []  See Discharge Summary         Progress towards goals / Updated goals:  Short Term Goals: To be accomplished in 1 weeks:  Goal: Patient will be independent and compliant with HEP in order to progress toward long term goals. Status at last note/certification: issued and reviewed  Current: goal in progress- pt reports no questions regarding initial HEP provided (12/16/21)  Long Term Goals: To be accomplished in 6 weeks:  Goal: Patient will improve FOTO assessment score to 70 pts in order to indicate improved functional abilities. Status at last note/certification: 40 pts  Current: Will assess near MD note (12/16/21)  Goal: Patient will improve Left elbow AROM to -10 to 130 deg in order to improve ease of ADLs. Status at last note/certification: -50 to 110 deg  Current: Left elbow AROM: lacking 8 degrees of extension to 130 degrees of flexion, pain at end range (12/20/21) progressing    Goal: Patient will improve Left elbow strength to 5/5 in order to improve ease of lifting tasks. Status at last note/certification: flexion 4/5, extension 4+/5  Current: met 12/27/21  Goal: Patient will improve Left wrist flexion/extension strength to 5/5 in order to improve ease of functional tasks. Status at last note/certification: 4/5  Current: met 12/27/21  Goal: Patient will report overall at least 65% improvement in functional abilities in order to prepare for self management.   Status at last note/certification: n/a  Current: reassess at MD note    PLAN  [x]  Upgrade activities as tolerated     [x]  Continue plan of care  [x]  Update interventions per flow sheet       []  Discharge due to:_  []  Other:_      Catherine Ulloa PTA 12/29/2021  2:45 PM    Future Appointments   Date Time Provider Subha Kat   12/30/2021 10:45 AM [facility]

## 2021-12-30 ENCOUNTER — HOSPITAL ENCOUNTER (OUTPATIENT)
Dept: PHYSICAL THERAPY | Age: 56
Discharge: HOME | End: 2021-12-30
Payer: OTHER GOVERNMENT

## 2021-12-30 PROCEDURE — 97140 MANUAL THERAPY 1/> REGIONS: CPT

## 2021-12-30 PROCEDURE — 97110 THERAPEUTIC EXERCISES: CPT

## 2021-12-30 NOTE — PROGRESS NOTES
PT DAILY TREATMENT NOTE     Patient Name: Derik Topete  Date:2021  : 1965  [x]  Patient  Verified  Payor:  / Plan: [facility] REGION / Product Type:  /    In time: 10:48  Out time: 11:34  Total Treatment Time (min): 46  Visit #: 9 of 12-15        Treatment Area: Left elbow pain [M25.522]    SUBJECTIVE  Pain Level (0-10 scale): 0/10  Any medication changes, allergies to medications, adverse drug reactions, diagnosis change, or new procedure performed?: [x] No    [] Yes (see summary sheet for update)  Subjective functional status/changes:   [] No changes reported  Pt reports elbow is felling good. He has no pain but some stiffness with increased weight last session.      OBJECTIVE    Modality rationale: Patient denied   Min Type Additional Details    [] Estim:  []Unatt       []IFC  []Premod                        []Other:  []w/ice   []w/heat  Position:  Location:    [] Estim: []Att    []TENS instruct  []NMES                    []Other:  []w/US   []w/ice   []w/heat  Position:  Location:    []  Traction: [] Cervical       []Lumbar                       [] Prone          []Supine                       []Intermittent   []Continuous Lbs:  [] before manual  [] after manual    []  Ultrasound: []Continuous   [] Pulsed                           []1MHz   []3MHz W/cm2:  Location:    []  Iontophoresis with dexamethasone         Location: [] Take home patch   [] In clinic    []  Ice     []  heat  []  Ice massage  []  Laser   []  Anodyne Position:  Location:    []  Laser with stim  []  Other:  Position:  Location:    []  Vasopneumatic Device    []  Right     []  Left  Pre-treatment girth:  Post-treatment girth:  Measured at (location):  Pressure:       [] lo [] med [] hi   Temperature: [] lo [] med [] hi   [] Skin assessment post-treatment:  []intact []redness- no adverse reaction    []redness  adverse reaction:     Vasopnuematic compression justification:  Per bilateral girth measures taken and listed above the edema is considered significant and having an impact on the patient's strength and gait/posture     31 min Therapeutic Exercise:  [x] See flow sheet :    Rationale: increase ROM and increase strength to improve the patients ability to perform ADLs    15 min Manual Therapy:  STM/TRP to Left extensor wad, gentle scar massage, manual stretching to Left elbow into flexion/extension    The manual therapy interventions were performed at a separate and distinct time from the therapeutic activities interventions. Rationale: decrease pain, increase ROM, increase tissue extensibility and decrease trigger points to improve Left UE functional abilities      With   [] TE   [] TA   [] neuro   [] other: Patient Education: [x] Review HEP    [] Progressed/Changed HEP based on:   [] positioning   [] body mechanics   [] transfers   [] heat/ice application    [] other:      Other Objective/Functional Measures:   - progressed reps as noted on FS   - minimal cuing for form with wall slides    Pain Level (0-10 scale) post treatment: 0/10    ASSESSMENT/Changes in Function:   Patient tolerated session well with light progression of reps. He continues to respond well to manual therapy with full flexion and extension immediately after. Will continue to progress as able. Patient will continue to benefit from skilled PT services to modify and progress therapeutic interventions, address functional mobility deficits, address ROM deficits, address strength deficits, analyze and address soft tissue restrictions, analyze and cue movement patterns, analyze and modify body mechanics/ergonomics, assess and modify postural abnormalities and instruct in home and community integration to attain remaining goals.      []  See Plan of Care  []  See progress note/recertification  []  See Discharge Summary         Progress towards goals / Updated goals:  Short Term Goals: To be accomplished in 1 weeks:  Goal: Patient will be independent and compliant with HEP in order to progress toward long term goals. Status at last note/certification: issued and reviewed  Current: goal in progress- pt reports no questions regarding initial HEP provided (12/16/21)  Long Term Goals: To be accomplished in 6 weeks:  Goal: Patient will improve FOTO assessment score to 70 pts in order to indicate improved functional abilities. Status at last note/certification: 40 pts  Current: Will assess near MD note (12/16/21)  Goal: Patient will improve Left elbow AROM to -10 to 130 deg in order to improve ease of ADLs. Status at last note/certification: -50 to 110 deg  Current: Left elbow AROM: lacking 8 degrees of extension to 130 degrees of flexion, pain at end range (12/20/21) progressing    Goal: Patient will improve Left elbow strength to 5/5 in order to improve ease of lifting tasks. Status at last note/certification: flexion 4/5, extension 4+/5  Current: met 12/27/21  Goal: Patient will improve Left wrist flexion/extension strength to 5/5 in order to improve ease of functional tasks. Status at last note/certification: 4/5  Current: met 12/27/21  Goal: Patient will report overall at least 65% improvement in functional abilities in order to prepare for self management. Status at last note/certification: n/a  Current: reassess at MD note    PLAN  [x]  Upgrade activities as tolerated     [x]  Continue plan of care  [x]  Update interventions per flow sheet       []  Discharge due to:_  []  Other:_     JOO Emanuel 12/30/2021  11:34 AM    No future appointments.

## 2022-01-04 ENCOUNTER — HOSPITAL ENCOUNTER (OUTPATIENT)
Dept: PHYSICAL THERAPY | Age: 57
Discharge: HOME | End: 2022-01-04
Payer: OTHER GOVERNMENT

## 2022-01-04 PROCEDURE — 97140 MANUAL THERAPY 1/> REGIONS: CPT

## 2022-01-04 PROCEDURE — 97110 THERAPEUTIC EXERCISES: CPT

## 2022-01-04 PROCEDURE — 97530 THERAPEUTIC ACTIVITIES: CPT

## 2022-01-04 NOTE — PROGRESS NOTES
PT DAILY TREATMENT NOTE     Patient Name: Laura Coleman  Date:2022  : 1965  [x]  Patient  Verified  Payor:  / Plan: [facility] REGION / Product Type:  /    In time: 8:30  Out time: 9:20  Total Treatment Time (min): 50  Visit #: 10 of -15    Treatment Area: Left elbow pain [M25.522]    SUBJECTIVE  Pain Level (0-10 scale): 0/10  Any medication changes, allergies to medications, adverse drug reactions, diagnosis change, or new procedure performed?: [x] No    [] Yes (see summary sheet for update)  Subjective functional status/changes:   [] No changes reported  \"Mostly just having stiffness with inactivity or laying on my left side. \"    OBJECTIVE    Modality rationale: Patient denied   Min Type Additional Details    [] Estim:  []Unatt       []IFC  []Premod                        []Other:  []w/ice   []w/heat  Position:  Location:    [] Estim: []Att    []TENS instruct  []NMES                    []Other:  []w/US   []w/ice   []w/heat  Position:  Location:    []  Traction: [] Cervical       []Lumbar                       [] Prone          []Supine                       []Intermittent   []Continuous Lbs:  [] before manual  [] after manual    []  Ultrasound: []Continuous   [] Pulsed                           []1MHz   []3MHz W/cm2:  Location:    []  Iontophoresis with dexamethasone         Location: [] Take home patch   [] In clinic   PD []  Ice     []  heat  []  Ice massage  []  Laser   []  Anodyne Position:  Location:    []  Laser with stim  []  Other:  Position:  Location:    []  Vasopneumatic Device    []  Right     []  Left  Pre-treatment girth:  Post-treatment girth:  Measured at (location):  Pressure:       [] lo [] med [] hi   Temperature: [] lo [] med [] hi   [] Skin assessment post-treatment:  []intact []redness- no adverse reaction    []redness  adverse reaction:     Vasopnuematic compression justification:  Per bilateral girth measures taken and listed above the edema is considered significant and having an impact on the patient's strength and gait/posture     30 min Therapeutic Exercise:  [x] See flow sheet :    Rationale: increase ROM and increase strength to improve the patients ability to perform ADLs    10 min Therapeutic Activity:  []  See flow sheet :   Rationale:FOTO/ Re-assessment    10 min Manual Therapy:  STM/TRP to Left extensor wad, gentle scar massage, manual stretching to Left elbow into flexion/extension    The manual therapy interventions were performed at a separate and distinct time from the therapeutic activities interventions. Rationale: decrease pain, increase ROM, increase tissue extensibility and decrease trigger points to improve Left UE functional abilities      With   [] TE   [] TA   [] neuro   [] other: Patient Education: [x] Review HEP    [] Progressed/Changed HEP based on:   [] positioning   [] body mechanics   [] transfers   [] heat/ice application    [] other:      Other Objective/Functional Measures: FOTO: 55    Pain Level (0-10 scale) post treatment: 0/10    ASSESSMENT/Changes in Function:  See PN. Patient will continue to benefit from skilled PT services to modify and progress therapeutic interventions, address functional mobility deficits, address ROM deficits, address strength deficits, analyze and address soft tissue restrictions, analyze and cue movement patterns, analyze and modify body mechanics/ergonomics, assess and modify postural abnormalities and instruct in home and community integration to attain remaining goals. []  See Plan of Care  [x]  See progress note/recertification  []  See Discharge Summary         Progress towards goals / Updated goals:  Short Term Goals: To be accomplished in 1 weeks:  Goal: Patient will be independent and compliant with HEP in order to progress toward long term goals.   Status at last note/certification: issued and reviewed   Current: Compliant with progressive HEP 1/4/22     Long Term Goals: To be accomplished in 6 weeks:  Goal: Patient will improve FOTO assessment score to 70 pts in order to indicate improved functional abilities. Status at last note/certification: 40 pts   Current: Progressing  55     1/4/22  Goal: Patient will improve Left elbow AROM to -10 to 130 deg in order to improve ease of ADLs. Status at last note/certification: -50 to 110 deg   Current: Left elbow AROM: lacking 8 degrees of extension to 130 degrees of flexion, pain at end range (12/20/21) progressing  1/4/22  Goal: Patient will improve Left elbow strength to 5/5 in order to improve ease of lifting tasks. Status at last note/certification: flexion 4/5, extension 4+/5   Current: Flex: 4+/5 Ext: 4+/5    1/4/22  Goal: Patient will improve Left wrist flexion/extension strength to 5/5 in order to improve ease of functional tasks. Status at last note/certification: 4/5   Current: Met 1/4/22  Goal: Patient will report overall at least 65% improvement in functional abilities in order to prepare for self management.   Status at last note/certification: n/a   Current: 70-80% overall improvement 1/4/22    PLAN  [x]  Upgrade activities as tolerated     [x]  Continue plan of care  [x]  Update interventions per flow sheet       []  Discharge due to:_  []  Other:_     Samantha Pagan PTA, LPTA 1/4/2022  11:34 AM    Future Appointments   Date Time Provider Subha Kat   1/4/2022  8:30 AM [address] [facility]   1/10/2022  2:45 PM Enma Charltont [facility]   1/20/2022  3:30 PM Mayo Carry [facility]   1/28/2022 10:45 AM Rajesh Rodriguez PTA [facility]

## 2022-01-04 NOTE — PROGRESS NOTES
[address] [facility] AT [facility]  [address] [facility] [address] [place] [address],[address], [place], [place]  Phone [phone]  Fax [phone]  PROGRESS NOTE  Patient Name: Consuela Lanes : 1965   Treatment/Medical Diagnosis: Left elbow pain [M25.522]   Referral Source: Malik Norman*     Date of Initial Visit: 12/10/21 Attended Visits: 10 Missed Visits: 0     SUMMARY OF TREATMENT  Treatment has consisted of manual therapy, therapeutic exercise, modalities as needed for pain control, and instruction and progression of a home exercise program.     CURRENT STATUS  Pt is 5 weeks post op. Patient reports approximately 70-80% overall improvement since initial evaluation with pain levels rated 0/10 @ best,  increased to 1/10 @ worst with inactivity and laying on L side (gets stiff). Pt denies numbness/tingling and sharp pain. Not performing any heavy lifting per protocol. Will cont to progress strengthening as tolerated and within protocol. Short Term Goals: To be accomplished in 1 weeks:  Goal: Patient will be independent and compliant with HEP in order to progress toward long term goals. Status at last note/certification: issued and reviewed   Current: Compliant with progressive HEP 22     Long Term Goals: To be accomplished in 6 weeks:  Goal: Patient will improve FOTO assessment score to 70 pts in order to indicate improved functional abilities. Status at last note/certification: 40 pts   Current: Progressing  55     22  Goal: Patient will improve Left elbow AROM to -10 to 130 deg in order to improve ease of ADLs. Status at last note/certification: -50 to 110 deg   Current: Progressing Left elbow AROM: lacking 4 degrees of extension to 130 degrees of flexion, discomfort at end range  progressing  22  Goal: Patient will improve Left elbow strength to 5/5 in order to improve ease of lifting tasks.   Status at last note/certification: flexion 4/5, extension 4+/5   Current: Progressing Flex: 4+/5 Ext: 4+/5    1/4/22  Goal: Patient will improve Left wrist flexion/extension strength to 5/5 in order to improve ease of functional tasks. Status at last note/certification: 4/5   Current: Met 1/4/22  Goal: Patient will report overall at least 65% improvement in functional abilities in order to prepare for self management. Status at last note/certification: n/a   Current: Met   70-80% overall improvement 1/4/22    New Goals to be achieved in __4-6__  weeks:  1. Patient will improve FOTO assessment score to 70 pts in order to indicate improved functional abilities. 2.  Patient will report overall at least 90% improvement in functional abilities in order to prepare for self management. 3.  Patient will improve Left elbow strength to 5/5 in order to improve ease of lifting tasks. 4.  Patient will improve Left elbow AROM to 0 to 130 deg in order to improve ease of ADLs. RECOMMENDATIONS  Cont PT 2x per week for 4 weeks to address aforementioned functional limitations. If you have any questions/comments please contact us directly at [phone]. Thank you for allowing us to assist in the care of your patient. LPTA Signature: Anila Raza PTA  Date: 1/4/2022   PT Signature: Narayan Mckeon PT  Time: 8:38 AM   NOTE TO PHYSICIAN:  PLEASE COMPLETE THE ORDERS BELOW AND FAX TO   [facility]: [phone]. If you are unable to process this request in 24 hours please contact our office: [phone].    ___ I have read the above report and request that my patient continue as recommended.   ___ I have read the above report and request that my patient continue therapy with the following changes/special instructions:_________________________________________________________   ___ I have read the above report and request that my patient be discharged from therapy.      Physician Signature:        Date:       Time:    Adalid Bass

## 2022-01-10 ENCOUNTER — APPOINTMENT (OUTPATIENT)
Dept: PHYSICAL THERAPY | Age: 57
End: 2022-01-10
Payer: OTHER GOVERNMENT

## 2022-01-11 ENCOUNTER — HOSPITAL ENCOUNTER (OUTPATIENT)
Dept: PHYSICAL THERAPY | Age: 57
Discharge: HOME | End: 2022-01-11
Payer: OTHER GOVERNMENT

## 2022-01-11 PROCEDURE — 97140 MANUAL THERAPY 1/> REGIONS: CPT

## 2022-01-11 PROCEDURE — 97110 THERAPEUTIC EXERCISES: CPT

## 2022-01-11 NOTE — PROGRESS NOTES
PT DAILY TREATMENT NOTE     Patient Name: Aleida Lynne  Date:2022  : 1965  [x]  Patient  Verified  Payor:  / Plan: [facility] / Product Type:  /    In time: 2:00  Out time: 2:50   Total Treatment Time (min): 50   Visit #: 1  of 12-15    Treatment Area: Left elbow pain [M25.522]    SUBJECTIVE  Pain Level (0-10 scale): 0/10  Any medication changes, allergies to medications, adverse drug reactions, diagnosis change, or new procedure performed?: [x] No    [] Yes (see summary sheet for update)  Subjective functional status/changes:   [] No changes reported  \"Received a brace from the PA to wear during repetitive activities. No pain. Feeling pretty good, every once in a while, in the evening, I'll feel a little sore. \"     OBJECTIVE    Modality rationale: Patient denied   Min Type Additional Details    [] Estim:  []Unatt       []IFC  []Premod                        []Other:  []w/ice   []w/heat  Position:  Location:    [] Estim: []Att    []TENS instruct  []NMES                    []Other:  []w/US   []w/ice   []w/heat  Position:  Location:    []  Traction: [] Cervical       []Lumbar                       [] Prone          []Supine                       []Intermittent   []Continuous Lbs:  [] before manual  [] after manual    []  Ultrasound: []Continuous   [] Pulsed                           []1MHz   []3MHz W/cm2:  Location:    []  Iontophoresis with dexamethasone         Location: [] Take home patch   [] In clinic   PD [x]  Ice     []  heat  []  Ice massage  []  Laser   []  Anodyne Position:Seated  Location: L elbow    []  Laser with stim  []  Other:  Position:  Location:    []  Vasopneumatic Device    []  Right     []  Left  Pre-treatment girth:  Post-treatment girth:  Measured at (location):  Pressure:       [] lo [] med [] hi   Temperature: [] lo [] med [] hi   [] Skin assessment post-treatment:  []intact []redness- no adverse reaction    []redness  adverse reaction: Vasopnuematic compression justification:  Per bilateral girth measures taken and listed above the edema is considered significant and having an impact on the patient's strength and gait/posture     40 min Therapeutic Exercise:  [x] See flow sheet :    Rationale: increase ROM and increase strength to improve the patients ability to perform ADLs    10 min Manual Therapy:  STM/TRP to L extensor wad, scar massage, manual stretching to L elbow into flexion/extension    The manual therapy interventions were performed at a separate and distinct time from the therapeutic activities interventions. Rationale: decrease pain, increase ROM, increase tissue extensibility and decrease trigger points to improve Left UE functional abilities      With   [] TE   [] TA   [] neuro   [] other: Patient Education: [x] Review HEP    [] Progressed/Changed HEP based on:   [] positioning   [] body mechanics   [] transfers   [] heat/ice application    [] other:      Other Objective/Functional Measures: Added wrist ext with 2# and wall push up. Progressed to 3# with full cans, bicep, triceps. Pain Level (0-10 scale) post treatment: 0/10    ASSESSMENT/Changes in Function:  Pt is 6 weeks post op and now able to perform wrist ext activities. No sx exacerbation with added  wrist ext activities. Per pt report following today's tx; \"feels more loose and better than it has in a long time. \"    Patient will continue to benefit from skilled PT services to modify and progress therapeutic interventions, address functional mobility deficits, address ROM deficits, address strength deficits, analyze and address soft tissue restrictions, analyze and cue movement patterns, analyze and modify body mechanics/ergonomics, assess and modify postural abnormalities and instruct in home and community integration to attain remaining goals.      []  See Plan of Care  [x]  See progress note/recertification  []  See Discharge Summary         Progress towards goals / Updated goals:    1. Patient will improve FOTO assessment score to 70 pts in order to indicate improved functional abilities. Current: 1/4/22; 55  2. Patient will report overall at least 90% improvement in functional abilities in order to prepare for self management. Current: 1/4/22; 70-80%   3. Patient will improve Left elbow strength to 5/5 in order to improve ease of lifting tasks. 4. Patient will improve Left elbow AROM to 0 to 130 deg in order to improve ease of ADLs.     PLAN  [x]  Upgrade activities as tolerated     [x]  Continue plan of care  [x]  Update interventions per flow sheet       []  Discharge due to:_  []  Other:_     Marilee Seats, LPTA 1/11/2022  11:34 AM    Future Appointments   Date Time Provider Subha Kat   1/11/2022  2:00 PM [facility]   1/20/2022  3:30 PM Joseluis Solis [facility]   1/28/2022 10:45 AM Jax Gillette PTA [facility]

## 2022-01-20 ENCOUNTER — APPOINTMENT (OUTPATIENT)
Dept: PHYSICAL THERAPY | Age: 57
End: 2022-01-20
Payer: OTHER GOVERNMENT

## 2022-01-26 NOTE — PROGRESS NOTES
[facility] [address]  [phone] fax    Discharge Summary    Patient name: Carolee Avalos Start of Care: 12/10/2021   Referral source: Clement Pond* : 1965   Medical/Treatment Diagnosis: Left elbow pain [M25.522]  Payor:  / Plan: [facility] 73 / Product Type:  /  Onset Date:21     Prior Hospitalization: see medical history Provider#: [id number]   Medications: Verified on Patient Summary List     Comorbidities: arthritis   Prior Level of Function: Functionally independent, lives with wife, works in assessment and training for [employer]    Visits from [place] of Care: 11    Missed Visits: 0    Reporting Period : 12/10/21 to 2022      1. Patient will improve FOTO assessment score to 70 pts in order to indicate improved functional abilities. Current: 22; 55  2. Patient will report overall at least 90% improvement in functional abilities in order to prepare for self management. Current: 22; 70-80%   3. Patient will improve Left elbow strength to 5/5 in order to improve ease of lifting tasks. Current: unable to reassess due to discharge  4. Patient will improve Left elbow AROM to 0 to 130 deg in order to improve ease of ADLs. Current: unable to reassess due to discharge    Assessment/ Summary of Care: Patient attended therapy for Left elbow pain following tendon repair on 21. He has made good gains in ROM and strength, and reports minimal pain levels. After last therapy session Patient followed up with MD and called clinic to let us know that he is doing well and will continue with HEP; we will discharge. Thank you for the referral of this Patient.      RECOMMENDATIONS:  [x]Discontinue therapy: [x]Patient has reached or is progressing toward set goals      []Patient is non-compliant or has abdicated      []Due to lack of appreciable progress towards set goals    Rafiq Del Angel, PT 1/26/2022 2:06 PM    Suzy Maher*

## 2022-01-28 ENCOUNTER — APPOINTMENT (OUTPATIENT)
Dept: PHYSICAL THERAPY | Age: 57
End: 2022-01-28
Payer: OTHER GOVERNMENT